# Patient Record
(demographics unavailable — no encounter records)

---

## 2025-02-04 NOTE — HISTORY OF PRESENT ILLNESS
[de-identified] : Ms. JIMMY GUDINO is a pleasant 37 year female presenting today as self-referral for URI and cough.   Pt reports last spring, she was seen several times by Dr Figueredo for recurrent sinus infections last winter. She had to be on abx 3-4x for sinus infections and was started on budesonide sprays and saline irrigations. She was in her 3rd trimester then and since has had her baby. About 1 week ago she started noticing she had another URI with nasal congestion, foul drainage, facial pressure. She also developed a cough. She started Mucinex and was intermittently using Flonase which helped and her nasal symptoms have since resolved. She still has a cough. It is mildly productive and worst in the mornings.    Past medical/surgical history unremarkable. Nonsmoker. Not on anticoagulation. Here by herself.

## 2025-02-04 NOTE — PROCEDURE
[FreeTextEntry3] : Flexible Laryngoscopy: Pre-operative diagnosis: cough Indication: Mirror exam insufficient to diagnose pathology Details: After decongestant and lidocaine was sprayed in the bilateral nasal cavities, a flexible endoscope was inserted into the right nares. The nasal cavity, middle meatus, and nasopharynx were visualized. The endoscope was then flexed inferiorly to visualize the oropharynx, hypopharynx, and larynx. The endoscope was then inserted into the left nares and an identical procedure was performed. The patient tolerated the procedure well. Findings: no masses or lesions, vocal cords symmetric and mobile bilaterally, no mucopurulence, bilateral middle meatuses clear, moderate pharyngeal cobblestoning and postcricoid edema, no masses or lesions

## 2025-02-04 NOTE — PHYSICAL EXAM
[TextEntry] : GEN: well nourished, well developed, no acute distress. VOICE: normal tone, quality, and volume, no hoarseness. HEAD: normocephalic, atraumatic, no TMJ pain or jaw clicking FACE: symmetric and motion intact, [place] 1  EXT NOSE:  no external deformity  INT NOSE: Mucosa healthy, leftward septum deviaton, moderate turbinate hypertrophy ORAL CAVITY: normal dentition with baseline occlusion, mucus membranes healthy, nonobstructive tonsils SKIN: intact, warm, and dry NEURO: alert & oriented x4

## 2025-02-04 NOTE — ASSESSMENT
[FreeTextEntry1] : .  Plan: - Agree with symptomatic measures including cough lozenges, drinking water, Mucinex. Reflux pamphlet provided to help mitigate reflux exacerbation of cough. Reassured that cough after a viral URI can sometimes persist for a few weeks. - No evidence of bacterial sinusitis. No indication for abx at this time. - Continue Flonase and saline irrigations - RTC 2-3 weeks or PRN   -- Bailey Yung MD [facility] Re[facility] Surgery